# Patient Record
Sex: FEMALE | Race: WHITE
[De-identification: names, ages, dates, MRNs, and addresses within clinical notes are randomized per-mention and may not be internally consistent; named-entity substitution may affect disease eponyms.]

---

## 2020-05-13 ENCOUNTER — HOSPITAL ENCOUNTER (EMERGENCY)
Dept: HOSPITAL 97 - ER | Age: 50
Discharge: HOME | End: 2020-05-13
Payer: COMMERCIAL

## 2020-05-13 VITALS — DIASTOLIC BLOOD PRESSURE: 77 MMHG | SYSTOLIC BLOOD PRESSURE: 136 MMHG | OXYGEN SATURATION: 100 %

## 2020-05-13 VITALS — TEMPERATURE: 99 F

## 2020-05-13 DIAGNOSIS — R07.89: ICD-10-CM

## 2020-05-13 DIAGNOSIS — Z88.5: ICD-10-CM

## 2020-05-13 DIAGNOSIS — F17.210: ICD-10-CM

## 2020-05-13 DIAGNOSIS — F07.81: Primary | ICD-10-CM
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
## 2020-05-13 PROCEDURE — 96375 TX/PRO/DX INJ NEW DRUG ADDON: CPT

## 2020-05-13 PROCEDURE — 99284 EMERGENCY DEPT VISIT MOD MDM: CPT

## 2020-05-13 PROCEDURE — 71250 CT THORAX DX C-: CPT

## 2020-05-13 PROCEDURE — 96374 THER/PROPH/DIAG INJ IV PUSH: CPT

## 2020-05-13 PROCEDURE — 70450 CT HEAD/BRAIN W/O DYE: CPT

## 2020-05-13 PROCEDURE — 72125 CT NECK SPINE W/O DYE: CPT
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
## 2020-05-16 ENCOUNTER — HOSPITAL ENCOUNTER (EMERGENCY)
Dept: HOSPITAL 97 - ER | Age: 50
Discharge: HOME | End: 2020-05-16
Payer: COMMERCIAL

## 2020-05-16 VITALS — SYSTOLIC BLOOD PRESSURE: 110 MMHG | DIASTOLIC BLOOD PRESSURE: 88 MMHG | OXYGEN SATURATION: 95 %

## 2020-05-16 VITALS — TEMPERATURE: 98.4 F

## 2020-05-16 DIAGNOSIS — Z88.5: ICD-10-CM
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
## 2020-05-16 DIAGNOSIS — M94.0: Primary | ICD-10-CM

## 2020-05-16 PROCEDURE — 99283 EMERGENCY DEPT VISIT LOW MDM: CPT

## 2020-05-16 PROCEDURE — 71046 X-RAY EXAM CHEST 2 VIEWS: CPT

## 2020-05-16 PROCEDURE — 96372 THER/PROPH/DIAG INJ SC/IM: CPT
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
